# Patient Record
(demographics unavailable — no encounter records)

---

## 2024-10-10 NOTE — PHYSICAL EXAM
[de-identified] : - Constitutional: This is a female in no obvious distress.  - Psych: Patient is alert and oriented to person, place and time.  Patient has a normal mood and affect. - Cardiovascular: Normal pulses throughout the upper extremities.  No significant varicosities are noted in the upper extremities.  ---  Examination of the right wrist and hand demonstrates no residual swelling or tenderness along the first dorsal compartment.  There is mild residual tenderness along the CMC joint of the thumb palmarly, which is much improved.  There is no swelling in this region.  There is mild tenderness along the FPL tendon of the thumb, distal to the A1 pulley.  There is no swelling or tenderness along the A1 pulley or evidence of a trigger thumb.  Provocative signs for carpal tunnel syndrome are negative.  She has intact sensation to light touch distally along the radial, ulnar and median nerve distributions.    Examination of her left hand demonstrates no obvious swelling or tenderness.  She has full flexion and extension of the digits.  Provocative signs for carpal tunnel syndrome are negative.  She has intact sensation to light touch distally along the radial, ulnar and median nerve distributions. [de-identified] : An MRI of her right hand dated 6/26/2024 demonstrated: Tenosynovitis of the extensor pollicis longus at the level of the proximal first metacarpal.  An MRI of her right wrist dated 6/26/2024 demonstrated: Tenosynovitis of the abductor pollicis longus and extensor pollicis brevis tendons.

## 2024-10-10 NOTE — RETURN TO WORK/SCHOOL
[FreeTextEntry1] : Patient was seen and examined today. She may return to work with no restrictions

## 2024-10-10 NOTE — DISCUSSION/SUMMARY
[FreeTextEntry1] : I had a discussion regarding today's visit, the diagnosis and treatment recommendations and options.  We also discussed changes since the last visit.  At this time, I have recommended observation and continue use of the carpal tunnel splints.. She would like to continue hand therapy. She would like to return to work. A note was provided today. She will follow-up in 6 weeks if needed.   The patient has agreed to the above plan of management and has expressed full understanding.  All questions were fully answered to the patient's satisfaction.  My cumulative time spent on today's visit was greater than 30 minutes and included: Preparation for the visit, review of the medical records, review of pertinent diagnostic studies, examination and counseling of the patient on the above diagnosis, treatment plan and prognosis, orders of diagnostic tests, medications and/or appropriate procedures and documentation in the medical records of today's visit.

## 2024-10-10 NOTE — HISTORY OF PRESENT ILLNESS
[FreeTextEntry1] : Workmen's Compensation case Date of accident: 5/23/2024 Working: No Degree of disability 100% from her occupation at this point in time.  She was given a note returning her to work today.  Follow-up regarding bilateral hand injuries at work on 5/23/2024.  She was given a cortisone injection at her right thumb CMC joint 5 weeks ago and at her right wrist for de Quervain's tendinitis 91 ago.  She has been treated with hand therapy.  She returns today for bilateral hand pain, numbness and tingling. She reports her symptoms have improved since her previous visit. She rates her pain as a 3/10. She has been wearing her braces as instructed. She would like to return to work.  EMGs dated 6/6/2024 demonstrated evidence of peripheral neuropathy affecting the left upper extremity involving the median nerve. 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

## 2024-10-10 NOTE — PHYSICAL EXAM
[de-identified] : - Constitutional: This is a female in no obvious distress.  - Psych: Patient is alert and oriented to person, place and time.  Patient has a normal mood and affect. - Cardiovascular: Normal pulses throughout the upper extremities.  No significant varicosities are noted in the upper extremities.  ---  Examination of the right wrist and hand demonstrates no residual swelling or tenderness along the first dorsal compartment.  There is mild residual tenderness along the CMC joint of the thumb palmarly, which is much improved.  There is no swelling in this region.  There is mild tenderness along the FPL tendon of the thumb, distal to the A1 pulley.  There is no swelling or tenderness along the A1 pulley or evidence of a trigger thumb.  Provocative signs for carpal tunnel syndrome are negative.  She has intact sensation to light touch distally along the radial, ulnar and median nerve distributions.    Examination of her left hand demonstrates no obvious swelling or tenderness.  She has full flexion and extension of the digits.  Provocative signs for carpal tunnel syndrome are negative.  She has intact sensation to light touch distally along the radial, ulnar and median nerve distributions. [de-identified] : An MRI of her right hand dated 6/26/2024 demonstrated: Tenosynovitis of the extensor pollicis longus at the level of the proximal first metacarpal.  An MRI of her right wrist dated 6/26/2024 demonstrated: Tenosynovitis of the abductor pollicis longus and extensor pollicis brevis tendons.

## 2024-10-10 NOTE — ADDENDUM
[FreeTextEntry1] :  I, Cornelius Styles, acted solely as a scribe for Dr. Rodarte on this date on 10/10/2024.

## 2024-10-10 NOTE — END OF VISIT
[FreeTextEntry3] : This note was written by Cornelius Styles on 10/10/2024 acting solely as a scribe for Dr. Brody Rodarte.   All medical record entries made by the Scribe were at my, Dr. Brody Rodarte, direction and personally dictated by me on 10/10/2024. I have personally reviewed the chart and agree that the record accurately reflects my personal performance of the history, physical exam, assessment and plan.

## 2025-01-08 NOTE — DISCUSSION/SUMMARY
[FreeTextEntry1] : I had a discussion regarding today's visit, the diagnosis and treatment recommendations and options.  We also discussed changes since the last visit.  I did tell her that I am not certain why she is still having persistent symptoms.  I did tell her that her previous MRI did not demonstrate any concerning findings.  We discussed further treatment options.  I recommended she begin a course of Celebrex 1-2 times a day with meals. I warned about potential GI side effects. I recommended for her to ask her Pulmonologist if there is any conscious indication, given her history of Asthma. She will follow up in 3 to 4 weeks if she is not improving.  If her symptoms have not improved at all at that point in time, I may consider repeating her MRI.  The patient has agreed to the above plan of management and has expressed full understanding.  All questions were fully answered to the patient's satisfaction.  My cumulative time spent on today's visit was greater than 30 minutes and included: Preparation for the visit, review of the medical records, review of pertinent diagnostic studies, examination and counseling of the patient on the above diagnosis, treatment plan and prognosis, orders of diagnostic tests, medications and/or appropriate procedures and documentation in the medical records of today's visit.

## 2025-01-08 NOTE — END OF VISIT
[FreeTextEntry3] : This note was written by Steve Gaines on 01/08/2025 acting solely as a scribe for Dr. Brody Rodarte.   All medical record entries made by the Scribe were at my, Dr. Brody Rodarte, direction and personally dictated by me on 01/08/2025. I have personally reviewed the chart and agree that the record accurately reflects my personal performance of the history, physical exam, assessment and plan.

## 2025-01-08 NOTE — ADDENDUM
[FreeTextEntry1] :  I, Steve Gaines, acted solely as a scribe for Dr. Rodarte on this date on 01/08/2025.

## 2025-01-08 NOTE — HISTORY OF PRESENT ILLNESS
[Has the patient missed work because of the injury/illness?] : The patient has missed work because of the injury/illness. [No] : The patient is currently not working. [FreeTextEntry1] : Workmen's Compensation case Date of accident: 5/23/2024 Working:  Degree of disability  Follow-up regarding bilateral hand injuries at work on 5/23/2024.  See prior notes.  She was previously given a cortisone injection at her right thumb CMC joint and a cortisone injection at her right wrist for de Quervain's tendinitis 91 ago.  She has been treated with hand therapy.  She returns today for bilateral hand pain. She states that her right hand is worse compared to her left. She has weakness. She rates her right thumb pain as a 5/10.  EMGs dated 6/6/2024 demonstrated evidence of peripheral neuropathy affecting the left upper extremity involving the median nerve. 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

## 2025-02-28 NOTE — END OF VISIT
[FreeTextEntry3] : This note was written by Steve Gaines on 02/28/2025 acting solely as a scribe for Dr. Brody Rodarte.   All medical record entries made by the Scribe were at my, Dr. Brody Rodarte, direction and personally dictated by me on 02/28/2025. I have personally reviewed the chart and agree that the record accurately reflects my personal performance of the history, physical exam, assessment and plan.

## 2025-02-28 NOTE — ADDENDUM
[FreeTextEntry1] :  I, Steve Gaines, acted solely as a scribe for Dr. Rodarte on this date on 02/28/2025.

## 2025-02-28 NOTE — PHYSICAL EXAM
[de-identified] : - Constitutional: This is a female in no obvious distress.  - Psych: Patient is alert and oriented to person, place and time.  Patient has a normal mood and affect. - Cardiovascular: Normal pulses throughout the upper extremities.  No significant varicosities are noted in the upper extremities.  ---  Examination of the right wrist and hand demonstrates mild tenderness without swelling along the first dorsal compartment and CMC joint of the thumb.  There is a mildly positive Finkelstein sign.  There is no evidence of trigger finger.  Provocative signs for carpal tunnel syndrome are partially positive today.  She has complaints of pain in the forearm and has nonlocalized tenderness.  She is neurovascularly intact distally.   Examination of her left hand demonstrates no swelling or tenderness.  She has full flexion and extension of the digits.  Provocative signs for carpal tunnel syndrome are partially positive.  She has complaints of pain at her forearm and has nonlocalized tenderness.  She is neurovascularly intact distally. [de-identified] : An MRI of her right hand dated 6/26/2024 demonstrated: Tenosynovitis of the extensor pollicis longus at the level of the proximal first metacarpal.  An MRI of her right wrist dated 6/26/2024 demonstrated: Tenosynovitis of the abductor pollicis longus and extensor pollicis brevis tendons.

## 2025-02-28 NOTE — HISTORY OF PRESENT ILLNESS
[Has the patient missed work because of the injury/illness?] : The patient has missed work because of the injury/illness. [No] : The patient is currently not working. [FreeTextEntry1] : Workmen's Compensation case Date of accident: 5/23/2024 Working:  Degree of disability  Follow-up regarding bilateral hand injuries at work on 5/23/2024.  See prior notes.  She was previously given a cortisone injection at her right thumb CMC joint and a cortisone injection at her right wrist for de Quervain's tendinitis 91 ago.  She has been treated with hand therapy.  She was last seen in the office 51 days ago and she was given a prescription for Celebrex 200 mg a day.  She returns today, for bilateral hand pain which worsened 1 week ago. She rates as a 7/10. She states that she stopped taking Celebrex, since it irritated her stomach. She has bilateral forearm numbness/tingling.   EMGs dated 6/6/2024 demonstrated evidence of peripheral neuropathy affecting the left upper extremity involving the median nerve. 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

## 2025-02-28 NOTE — DISCUSSION/SUMMARY
[FreeTextEntry1] : I had a discussion regarding today's visit, the diagnosis and treatment recommendations and options.  We also discussed changes since the last visit.  At this time, she was fitted with a right carpal tunnel splint. She was instructed on bracing at night and was provided with the appropriate referral for occupational therapy.  I am requesting authorization for further occupational therapy.  She will follow up in 4 weeks.   The patient has agreed to the above plan of management and has expressed full understanding.  All questions were fully answered to the patient's satisfaction.  My cumulative time spent on today's visit was greater than 30 minutes and included: Preparation for the visit, review of the medical records, review of pertinent diagnostic studies, examination and counseling of the patient on the above diagnosis, treatment plan and prognosis, orders of diagnostic tests, medications and/or appropriate procedures and documentation in the medical records of today's visit.

## 2025-03-20 NOTE — PHYSICAL EXAM
[de-identified] : - Constitutional: This is a female in no obvious distress.  - Psych: Patient is alert and oriented to person, place and time.  Patient has a normal mood and affect. - Cardiovascular: Normal pulses throughout the upper extremities.  No significant varicosities are noted in the upper extremities.  ---  Examination of the right wrist and hand demonstrates mild tenderness without swelling along the first dorsal compartment and CMC joint of the thumb.  There is a mildly positive Finkelstein sign.  There is no evidence of trigger finger.  Provocative signs for carpal tunnel syndrome are partially positive today.  She has complaints of pain in the forearm and has nonlocalized tenderness.  She is neurovascularly intact distally.   Examination of her left hand demonstrates no swelling or tenderness.  She has full flexion and extension of the digits.  Provocative signs for carpal tunnel syndrome are partially positive.  She has complaints of pain at her forearm and has nonlocalized tenderness.  She is neurovascularly intact distally. [de-identified] : An MRI of her right hand dated 6/26/2024 demonstrated: Tenosynovitis of the extensor pollicis longus at the level of the proximal first metacarpal.  An MRI of her right wrist dated 6/26/2024 demonstrated: Tenosynovitis of the abductor pollicis longus and extensor pollicis brevis tendons.

## 2025-03-20 NOTE — HISTORY OF PRESENT ILLNESS
[FreeTextEntry1] : Workmen's Compensation case Date of accident: 5/23/2024 Working:  Degree of disability  Follow-up regarding bilateral hand injuries at work on 5/23/2024.  See prior notes.  She was previously given a cortisone injection at her right thumb CMC joint and a cortisone injection at her right wrist for de Quervain's tendinitis.  She has been treated with hand therapy.  When she was last seen in the office 4 weeks ago, she was referred back to hand therapy and I recommended carpal tunnel splints at night.  She is  EMGs dated 6/6/2024 demonstrated evidence of peripheral neuropathy affecting the left upper extremity involving the median nerve. 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 [No] : The patient is currently not working.

## 2025-03-21 NOTE — PHYSICAL EXAM
[de-identified] : - Constitutional: This is a female in no obvious distress.  - Psych: Patient is alert and oriented to person, place and time.  Patient has a normal mood and affect. - Cardiovascular: Normal pulses throughout the upper extremities.  No significant varicosities are noted in the upper extremities.  ---  Examination of the right wrist and hand demonstrates mild tenderness without swelling along the first dorsal compartment and CMC joint of the thumb.  There is a mildly positive Finkelstein sign.  There is no evidence of trigger finger.  Provocative signs for carpal tunnel syndrome are partially positive today.  She has complaints of pain in the forearm and has nonlocalized tenderness.  She is neurovascularly intact distally.   Examination of her left hand demonstrates no swelling or tenderness.  She has full flexion and extension of the digits.  Provocative signs for carpal tunnel syndrome are partially positive.  She has complaints of pain at her forearm and has nonlocalized tenderness.  She is neurovascularly intact distally. [de-identified] : An MRI of her right hand dated 6/26/2024 demonstrated: Tenosynovitis of the extensor pollicis longus at the level of the proximal first metacarpal.  An MRI of her right wrist dated 6/26/2024 demonstrated: Tenosynovitis of the abductor pollicis longus and extensor pollicis brevis tendons.

## 2025-03-21 NOTE — HISTORY OF PRESENT ILLNESS
[FreeTextEntry1] : Workmen's Compensation case Date of accident: 5/23/2024 Working:  Degree of disability  Follow-up regarding bilateral hand injuries at work on 5/23/2024.  See prior notes.  She was previously given a cortisone injection at her right thumb CMC joint and a cortisone injection at her right wrist for de Quervain's tendinitis.  She has been treated with hand therapy.  When she was last seen in the office 4 weeks ago, she was referred back to hand therapy and I recommended carpal tunnel splints at night.  She is  EMGs dated 6/6/2024 demonstrated evidence of peripheral neuropathy affecting the left upper extremity involving the median nerve. 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